# Patient Record
(demographics unavailable — no encounter records)

---

## 2025-06-04 NOTE — CONSULT LETTER
[Consult Letter:] : I had the pleasure of evaluating your patient, [unfilled]. [Please see my note below.] : Please see my note below. [Consult Closing:] : Thank you very much for allowing me to participate in the care of this patient.  If you have any questions, please do not hesitate to contact me. [Sincerely,] : Sincerely, [Dear  ___] : Dear  [unfilled], [FreeTextEntry3] : Ulises Del Cid MD Co-Director [facility]

## 2025-06-04 NOTE — HISTORY OF PRESENT ILLNESS
[Right] : right hand dominant [FreeTextEntry1] : Date of injury: 6/3/2025 (1 day status post injury)  Patient here for initial evaluation of left middle finger injury sustained from playing basketball.  He reports that he hyperextended his finger and heard a crack. He noticed swelling and bruising of the PIP joint.  Patient has been gela taping the finger. He has been taking Advil and Tylenol. No previous images done.

## 2025-06-04 NOTE — ASSESSMENT
[FreeTextEntry1] : My impression is that the patient has a left middle finger proximal phalanx base fracture.  There is a rotational and angular deformity.  Therefore recommend he undergo closed reduction pinning versus ORIF left middle finger proximal phalanx fracture  Risk benefits and alternatives discussed included, but not limited to stiffness need for therapy adhesions etc.  He will go into a hand-based ulnar gutter splint and I encourage PIP range of motion even preoperatively so he does not get stiff

## 2025-06-26 NOTE — HISTORY OF PRESENT ILLNESS
[de-identified] : 2 weeks 1 days s/p Left middle finger proximal phalanx fracture closed reduction and percutaneous pinning.  Patient called stating he had increased pain near pin site which he noticed yesterday.  [de-identified] : DOS: 6/11/25. [de-identified] : Cast removed radial pin rotated and was digging in.  Excellent PIP range of motion normal [de-identified] : PA lateral oblique x-rays demonstrate the pins in good position.  Radial pin backed out slightly [de-identified] : 2 weeks 1 days s/p Left middle finger proximal phalanx fracture closed reduction and percutaneous pinning [de-identified] : Patient was recasted after the pin was derotated and will return for pin removal at his regular scheduled appointment

## 2025-07-08 NOTE — HISTORY OF PRESENT ILLNESS
[de-identified] : DOS: 6/11/25. [de-identified] : 3 weeks 6 days s/p Left middle finger proximal phalanx fracture closed reduction and percutaneous pinning.  [de-identified] : Excellent PIP range of motion.  Pin sites clean dry and intact no infection normal rotation.  Nontender at fracture site [de-identified] : PA lateral oblique x-ray left middle finger demonstrates a proximal phalanx fracture in good alignment [de-identified] : 3 weeks 6 days s/p Left middle finger proximal phalanx fracture closed reduction and percutaneous pinning [de-identified] : At this point I recommended pin removal.  Under informed consent sterile conditions the pins were removed and sterile Band-Aid applied he will begin immediate therapy.  Can discontinue the splint in 1 week and I will see him back in 3 to 4 weeks

## 2025-07-08 NOTE — HISTORY OF PRESENT ILLNESS
[de-identified] : DOS: 6/11/25. [de-identified] : 3 weeks 6 days s/p Left middle finger proximal phalanx fracture closed reduction and percutaneous pinning.  [de-identified] : Excellent PIP range of motion.  Pin sites clean dry and intact no infection normal rotation.  Nontender at fracture site [de-identified] : PA lateral oblique x-ray left middle finger demonstrates a proximal phalanx fracture in good alignment [de-identified] : 3 weeks 6 days s/p Left middle finger proximal phalanx fracture closed reduction and percutaneous pinning [de-identified] : At this point I recommended pin removal.  Under informed consent sterile conditions the pins were removed and sterile Band-Aid applied he will begin immediate therapy.  Can discontinue the splint in 1 week and I will see him back in 3 to 4 weeks

## 2025-07-30 NOTE — HISTORY OF PRESENT ILLNESS
[de-identified] : DOS: 6/11/25. [de-identified] : 7 weeks s/p Left middle finger proximal phalanx fracture closed reduction and percutaneous pinning. OT 3x/week.  [de-identified] : Pin sites healing slight hyperpigmentation.  Has near full composite flexion maybe lacks a few degrees of MP extension.  Nontender at fracture site normal rotation [de-identified] : PA lateral oblique x-ray left hand demonstrates a healed proximal phalanx fracture in excellent alignment [de-identified] : 7 weeks s/p Left middle finger proximal phalanx fracture closed reduction and percutaneous pinning. [de-identified] : Recommend he advance his therapy work on additional strength training internal range of motion and can return on an as-needed basis

## 2025-07-30 NOTE — HISTORY OF PRESENT ILLNESS
[de-identified] : DOS: 6/11/25. [de-identified] : 7 weeks s/p Left middle finger proximal phalanx fracture closed reduction and percutaneous pinning. OT 3x/week.  [de-identified] : Pin sites healing slight hyperpigmentation.  Has near full composite flexion maybe lacks a few degrees of MP extension.  Nontender at fracture site normal rotation [de-identified] : PA lateral oblique x-ray left hand demonstrates a healed proximal phalanx fracture in excellent alignment [de-identified] : 7 weeks s/p Left middle finger proximal phalanx fracture closed reduction and percutaneous pinning. [de-identified] : Recommend he advance his therapy work on additional strength training internal range of motion and can return on an as-needed basis